# Patient Record
Sex: MALE | Race: WHITE | URBAN - METROPOLITAN AREA
[De-identification: names, ages, dates, MRNs, and addresses within clinical notes are randomized per-mention and may not be internally consistent; named-entity substitution may affect disease eponyms.]

---

## 2024-05-23 ENCOUNTER — OFFICE VISIT (OUTPATIENT)
Dept: PRIMARY CARE | Facility: CLINIC | Age: 44
End: 2024-05-23
Payer: COMMERCIAL

## 2024-05-23 VITALS
HEIGHT: 71 IN | DIASTOLIC BLOOD PRESSURE: 56 MMHG | TEMPERATURE: 98.3 F | RESPIRATION RATE: 14 BRPM | BODY MASS INDEX: 24.22 KG/M2 | SYSTOLIC BLOOD PRESSURE: 124 MMHG | HEART RATE: 64 BPM | WEIGHT: 173 LBS | OXYGEN SATURATION: 96 %

## 2024-05-23 DIAGNOSIS — E78.41 ELEVATED LIPOPROTEIN(A): ICD-10-CM

## 2024-05-23 DIAGNOSIS — N28.89 NODULE OF KIDNEY: Primary | ICD-10-CM

## 2024-05-23 DIAGNOSIS — R79.89 ELEVATED TESTOSTERONE LEVEL: ICD-10-CM

## 2024-05-23 DIAGNOSIS — Z00.00 ROUTINE HEALTH MAINTENANCE: ICD-10-CM

## 2024-05-23 DIAGNOSIS — K86.2 PANCREATIC CYST (HHS-HCC): ICD-10-CM

## 2024-05-23 DIAGNOSIS — M46.1 SACROILIITIS (CMS-HCC): ICD-10-CM

## 2024-05-23 DIAGNOSIS — M25.812 IMPINGEMENT OF LEFT SHOULDER: ICD-10-CM

## 2024-05-23 DIAGNOSIS — N28.1 RENAL CYST: ICD-10-CM

## 2024-05-23 PROBLEM — L20.9 ATOPIC DERMATITIS: Status: ACTIVE | Noted: 2024-05-23

## 2024-05-23 PROBLEM — J18.9 PNEUMONIA: Status: ACTIVE | Noted: 2019-08-29

## 2024-05-23 PROCEDURE — 99386 PREV VISIT NEW AGE 40-64: CPT | Performed by: STUDENT IN AN ORGANIZED HEALTH CARE EDUCATION/TRAINING PROGRAM

## 2024-05-23 PROCEDURE — 1036F TOBACCO NON-USER: CPT | Performed by: STUDENT IN AN ORGANIZED HEALTH CARE EDUCATION/TRAINING PROGRAM

## 2024-05-23 RX ORDER — ROSUVASTATIN CALCIUM 5 MG/1
5 TABLET, COATED ORAL
COMMUNITY
Start: 2024-05-09 | End: 2025-05-09

## 2024-05-23 ASSESSMENT — ENCOUNTER SYMPTOMS
LIGHT-HEADEDNESS: 0
VOMITING: 0
DIAPHORESIS: 0
FEVER: 0
DIZZINESS: 0
CHILLS: 0
NAUSEA: 0
DIARRHEA: 0
MYALGIAS: 0
SHORTNESS OF BREATH: 0
UNEXPECTED WEIGHT CHANGE: 0
DYSURIA: 0

## 2024-05-23 NOTE — PATIENT INSTRUCTIONS
Please call [phone] to schedule the CT scan of your kidneys.  Please follow-up with [facility] regarding the pancreas cyst and you can call [phone] to schedule this.  Please get fasting blood work at your earliest convenience.  Follow-up with me in 1 year for physical, sooner if needed

## 2024-05-23 NOTE — PROGRESS NOTES
"Subjective   Patient ID: Sanjeev Alexandra is a 43 y.o. male who presents for Establish Care.  HPI    He is here to establish care and for CPE. No acute concerns today.         Review of Systems   Constitutional:  Negative for chills, diaphoresis, fever and unexpected weight change.   HENT:  Negative for hearing loss.    Eyes:  Negative for visual disturbance.   Respiratory:  Negative for shortness of breath.    Cardiovascular:  Negative for chest pain.   Gastrointestinal:  Negative for diarrhea, nausea and vomiting.   Endocrine: Negative for cold intolerance and heat intolerance.   Genitourinary:  Negative for dysuria, scrotal swelling and testicular pain.   Musculoskeletal:  Negative for myalgias.   Skin:  Negative for rash.   Neurological:  Negative for dizziness, syncope and light-headedness.       /56   Pulse 64   Temp 36.8 °C (98.3 °F) (Temporal)   Resp 14   Ht 1.803 m (5' 11\")   Wt 78.5 kg (173 lb)   SpO2 96%   BMI 24.13 kg/m²     Objective   Physical Exam  Vitals reviewed.   Constitutional:       General: He is not in acute distress.     Appearance: Normal appearance.   HENT:      Head: Normocephalic.      Right Ear: Tympanic membrane, ear canal and external ear normal. There is no impacted cerumen.      Left Ear: Tympanic membrane, ear canal and external ear normal. There is no impacted cerumen.      Mouth/Throat:      Mouth: Mucous membranes are moist.      Pharynx: Oropharynx is clear. No oropharyngeal exudate or posterior oropharyngeal erythema.   Cardiovascular:      Rate and Rhythm: Normal rate and regular rhythm.   Pulmonary:      Effort: Pulmonary effort is normal. No respiratory distress.      Breath sounds: Normal breath sounds.   Abdominal:      General: Bowel sounds are normal. There is no distension.      Palpations: Abdomen is soft. There is no mass.      Tenderness: There is no abdominal tenderness. There is no guarding or rebound.   Musculoskeletal:         General: No deformity. "      Cervical back: Neck supple. No tenderness.   Lymphadenopathy:      Cervical: No cervical adenopathy.   Skin:     Coloration: Skin is not jaundiced.   Neurological:      Mental Status: He is alert.         Assessment/Plan   Problem List Items Addressed This Visit       Nodule of kidney - Primary    Relevant Orders    CT kidney w and wo IV contrast    Routine health maintenance    Relevant Orders    HIV 1/2 Antigen/Antibody Screen with Reflex to Confirmation    Hepatitis C Antibody    CBC    Comprehensive Metabolic Panel    Lipid Panel    TSH with reflex to Free T4 if abnormal    Renal cyst    Sacroiliitis (CMS-HCC)    Elevated lipoprotein(a)    Impingement of left shoulder    Elevated testosterone level    Pancreatic cyst (HHS-HCC)       Pancreatic cyst  -Seeing GI and advised f/u, recommended he reach out to GI to discuss follow up imaging as it can be directly compared to previous imaging and they appeared to try to reach him for follow up.     Renal cyst  Renal nodularity  -F/u 6-12 months was advised on MRI from 3/2023 for pancreas  -CT kidney with and wo contrast ordered, explained this is with radiation and different from MRI and with IV contrast.     Sacroiliitis  -This resolved    Elevated lipoprotein a  - On low-dose rosuvastatin 5mg/day for 3-4 months to achieve LDL less than 70, saw cardiology  -Repeat lipid panel ordered    Left shoulder impingement  - Sees orthopedics for this, suspected to be biceps tendinitis. Still sore but not major issue. F/u orthopedics as needed for this.    Elevated testosterone  - Endocrinology was consulted at [facility], was likely indicating high SHBG and not of clinical significance.  - Was told doesn't need follow up for this. Management per endocrinology. Per their last note he is to follow up as needed.     Health Maintenance  -Prostate Cancer Screening: Age 50  -Vaccinations: Reports TDAP done 7 years ago while wife was pregnant. Advised covid booster. Flu  vaccine after labor day.   -Lung Cancer Screening: Not indicated  -AAA Screening:Not indicated  -Colonoscopy: Due 2030, done 2020.   -Screen for HIV/Hep C: Ordered    CPE completed.  Advised to keep a heart healthy, low fat  diet with fruits and veggies like Mediterranean diet.  Advised on the importance of exercise and maintaining 150 minutes of exercise per week (30 minutes per day 5 days a week).  Advised on regular eye and dental visits.  Discussed age appropriate cancer screening, immunizations and recommendations given.  Discussed avoiding illicit drugs and tobacco. Advised on appropriate use of alcohol.  Advised to wear seat belt.    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     CPE 1 year  F/u sooner PRN  Fasting labs ordered

## 2024-05-24 ENCOUNTER — LAB (OUTPATIENT)
Dept: LAB | Facility: LAB | Age: 44
End: 2024-05-24
Payer: COMMERCIAL

## 2024-05-24 DIAGNOSIS — Z00.00 ROUTINE HEALTH MAINTENANCE: ICD-10-CM

## 2024-05-24 DIAGNOSIS — R17 ELEVATED BILIRUBIN: ICD-10-CM

## 2024-05-24 LAB
ALBUMIN SERPL BCP-MCNC: 4.6 G/DL (ref 3.4–5)
ALP SERPL-CCNC: 53 U/L (ref 33–120)
ALT SERPL W P-5'-P-CCNC: 47 U/L (ref 10–52)
ANION GAP SERPL CALC-SCNC: 13 MMOL/L (ref 10–20)
AST SERPL W P-5'-P-CCNC: 24 U/L (ref 9–39)
BILIRUB SERPL-MCNC: 1.6 MG/DL (ref 0–1.2)
BUN SERPL-MCNC: 22 MG/DL (ref 6–23)
CALCIUM SERPL-MCNC: 9.4 MG/DL (ref 8.6–10.6)
CHLORIDE SERPL-SCNC: 103 MMOL/L (ref 98–107)
CHOLEST SERPL-MCNC: 130 MG/DL (ref 0–199)
CHOLESTEROL/HDL RATIO: 2.1
CO2 SERPL-SCNC: 29 MMOL/L (ref 21–32)
CREAT SERPL-MCNC: 1.03 MG/DL (ref 0.5–1.3)
EGFRCR SERPLBLD CKD-EPI 2021: >90 ML/MIN/1.73M*2
ERYTHROCYTE [DISTWIDTH] IN BLOOD BY AUTOMATED COUNT: 13.1 % (ref 11.5–14.5)
GLUCOSE SERPL-MCNC: 85 MG/DL (ref 74–99)
HCT VFR BLD AUTO: 47 % (ref 41–52)
HDLC SERPL-MCNC: 60.5 MG/DL
HGB BLD-MCNC: 15 G/DL (ref 13.5–17.5)
HIV 1+2 AB+HIV1 P24 AG SERPL QL IA: NONREACTIVE
LDLC SERPL CALC-MCNC: 56 MG/DL
MCH RBC QN AUTO: 26 PG (ref 26–34)
MCHC RBC AUTO-ENTMCNC: 31.9 G/DL (ref 32–36)
MCV RBC AUTO: 82 FL (ref 80–100)
NON HDL CHOLESTEROL: 70 MG/DL (ref 0–149)
NRBC BLD-RTO: 0 /100 WBCS (ref 0–0)
PLATELET # BLD AUTO: 172 X10*3/UL (ref 150–450)
POTASSIUM SERPL-SCNC: 4.7 MMOL/L (ref 3.5–5.3)
PROT SERPL-MCNC: 7.1 G/DL (ref 6.4–8.2)
RBC # BLD AUTO: 5.76 X10*6/UL (ref 4.5–5.9)
SODIUM SERPL-SCNC: 140 MMOL/L (ref 136–145)
TRIGL SERPL-MCNC: 68 MG/DL (ref 0–149)
TSH SERPL-ACNC: 1.64 MIU/L (ref 0.44–3.98)
VLDL: 14 MG/DL (ref 0–40)
WBC # BLD AUTO: 4.6 X10*3/UL (ref 4.4–11.3)

## 2024-05-24 PROCEDURE — 36415 COLL VENOUS BLD VENIPUNCTURE: CPT

## 2024-05-24 PROCEDURE — 80053 COMPREHEN METABOLIC PANEL: CPT

## 2024-05-24 PROCEDURE — 80061 LIPID PANEL: CPT

## 2024-05-24 PROCEDURE — 86803 HEPATITIS C AB TEST: CPT

## 2024-05-24 PROCEDURE — 87389 HIV-1 AG W/HIV-1&-2 AB AG IA: CPT

## 2024-05-24 PROCEDURE — 84443 ASSAY THYROID STIM HORMONE: CPT

## 2024-05-24 PROCEDURE — 82248 BILIRUBIN DIRECT: CPT

## 2024-05-24 PROCEDURE — 85027 COMPLETE CBC AUTOMATED: CPT

## 2024-05-25 DIAGNOSIS — R17 ELEVATED BILIRUBIN: Primary | ICD-10-CM

## 2024-05-25 LAB
BILIRUB DIRECT SERPL-MCNC: 0.3 MG/DL (ref 0–0.3)
HCV AB SER QL: NONREACTIVE

## 2024-06-06 ENCOUNTER — HOSPITAL ENCOUNTER (OUTPATIENT)
Dept: RADIOLOGY | Facility: CLINIC | Age: 44
Discharge: HOME | End: 2024-06-06
Payer: COMMERCIAL

## 2024-06-06 DIAGNOSIS — N28.89 NODULE OF KIDNEY: ICD-10-CM

## 2024-06-06 PROCEDURE — 74170 CT ABD WO CNTRST FLWD CNTRST: CPT | Performed by: RADIOLOGY

## 2024-06-06 PROCEDURE — 74170 CT ABD WO CNTRST FLWD CNTRST: CPT

## 2024-06-06 PROCEDURE — 2550000001 HC RX 255 CONTRASTS: Performed by: STUDENT IN AN ORGANIZED HEALTH CARE EDUCATION/TRAINING PROGRAM

## 2024-06-06 RX ADMIN — IOHEXOL 90 ML: 350 INJECTION, SOLUTION INTRAVENOUS at 09:44

## 2024-07-08 ENCOUNTER — APPOINTMENT (OUTPATIENT)
Dept: SLEEP MEDICINE | Facility: CLINIC | Age: 44
End: 2024-07-08
Payer: COMMERCIAL

## 2024-07-17 ENCOUNTER — APPOINTMENT (OUTPATIENT)
Dept: SLEEP MEDICINE | Facility: CLINIC | Age: 44
End: 2024-07-17
Payer: COMMERCIAL

## 2024-07-17 DIAGNOSIS — G47.8 CATATHRENIA: ICD-10-CM

## 2024-07-17 DIAGNOSIS — R53.83 FATIGUE, UNSPECIFIED TYPE: ICD-10-CM

## 2024-07-17 DIAGNOSIS — G47.8 NON-RESTORATIVE SLEEP: ICD-10-CM

## 2024-07-17 DIAGNOSIS — G47.30 SLEEP-DISORDERED BREATHING: Primary | ICD-10-CM

## 2024-07-17 PROCEDURE — 1036F TOBACCO NON-USER: CPT | Performed by: PSYCHIATRY & NEUROLOGY

## 2024-07-17 PROCEDURE — 99204 OFFICE O/P NEW MOD 45 MIN: CPT | Performed by: PSYCHIATRY & NEUROLOGY

## 2024-07-17 NOTE — PROGRESS NOTES
Patient: Sanjeev Alexandra    [id number]  : 1980 -- AGE 44 y.o.    Provider: Gregory Deshpande MD     [facility]   Service Date: 2024              [facility]  New Visit Note      Virtual or Telephone Consent  An interactive audio and video telecommunication system which permits real time communications between the patient (at the originating site) and provider (at the distant site) was utilized to provide this telehealth service.   Verbal consent was requested and obtained from Sanjeev Alexandra on this date, 24 for a telehealth visit.   I verified the patient's identity and physical location in [place].  If this is a new patient to me, I informed the patient of my name and type of active [place] license that I hold.        The patient's referring provider is: No ref. provider found    HPI:  Sanjeev Alexandra is a 44 y.o. male with hyperlipidemia who presents today with suspected sleep apnea with poor sleep that is nonrestorative, fatigue, snoring, and waking up multiple times per night.    He has snoring that is rated 3 out of 10 in severity, started many years ago, worse when prone, unknown if worsening over time. He is usually unaware of waking up in the night. Moaning in sleep started in middle school. Generally does not wake up in the night. Has been told he holds his breath in his sleep each night and then gasps for air. No matter how much he sleeps he feels his sleep is not restorative. Did not wake up feeling refreshed in high school, but woke up early for sports.    Denies nocturnal choking, night sweats, nocturnal reflux, bruxism, morning sore throat/dry mouth, morning headaches, or nocturia.    Denies dreaming at sleep onset, sleep-related hallucinations, sleep paralysis, sleepwalking, sleep talking, or dream enactment behavior.    He has put off doing a sleep study due to cost, but now it would be feasible for him.    He tried an OTC oral appliance for  sleep apnea for a few nights, but did not tolerate it.    Traveling to [place] in a couple of weeks.    DAYTIME SYMPTOMS  [place]: 4/24  Insomnia severity index: 5/28  Daytime sleepiness: mild, more so when idle - can doze if he wants to  Fatigue: moderate in intensity consistently  Trouble with memory/concentration: has some short-term memory complaints  Feeling sleepy while driving: no    RLS symptoms: No   Cataplexy: No    SLEEP HABITS:   Preferred sleep position: prone or side, cannot stay asleep when supine  Bedtime: 10 pm, sleep latency 15 min  Wake time: 6-7 am  Napping: no.   Total estimated sleep per 24 hrs: 7-9 hours    PRIOR SLEEP STUDIES:  None     PRIOR TREATMENTS:  None     Patient Active Problem List   Diagnosis    Pneumonia    Atopic dermatitis    Nodule of kidney    Routine health maintenance    Renal cyst    Sacroiliitis (CMS-HCC)    Elevated lipoprotein(a)    Impingement of left shoulder    Elevated testosterone level    Pancreatic cyst (HHS-HCC)     No past medical history on file.  Past Surgical History:   Procedure Laterality Date    APPENDECTOMY  03/13/2017    Appendectomy    TONSILLECTOMY  03/13/2017    Tonsillectomy    VASECTOMY  05/31/2024       Current Outpatient Medications   Medication Sig Dispense Refill    rosuvastatin (Crestor) 5 mg tablet Take 1 tablet (5 mg) by mouth once daily.       No current facility-administered medications for this visit.     Allergies   Allergen Reactions    Cefadroxil Other and Rash     **NOTE** Tolerated augmentin 8/2019      Around 1998, had minor rash with cefadroxil       FAMILY HISTORY OF SLEEP DISORDERS: father is a poor sleeper  Family History   Problem Relation Name Age of Onset    No Known Problems Mother      Hypertension Father         SOCIAL HISTORY  Employment: self-employed - commercial real estate investing  Lives with: wife  Alcohol: 1-2 beers/week  Cigarettes: never  Illicits: none  Caffeine: 2 servings/day - helps with alertness     ROS: 12  point ROS is negative for all items.    PHYSICAL EXAMINATION:   General: Awake. Alert. Comfortable. No apparent distress.   Speech: Normal  Comprehension: Normal  Mood: Stable  Affect: Appropriate  Eyes:   Eyelids: normal            ENT:          Unable to assess patency of nasal passageways or for presence of nasal septum deviation. Unable to clearly view posterior oropharynx to assess tonsils, uvula, and Martínez tongue score. Tongue scalloping is not present, tongue is not enlarged, Retrognathia is not present. Dentition is very good, though some teeth are malaligned.           Neck:          Circumference: normal in caliber  Cardiac:  unable to assess pulses or cardiac rate/rhythm.  Pul:          Normal respiratory effort   Abd:         non-obese  Neuro: Alert, well-oriented. Cranial nerves II-XII grossly normal and symmetric.  No abnormal movements noted      LABS/DIAGNOSTICS:  Lab Results   Component Value Date    HGB 15.0 05/24/2024    CO2 29 05/24/2024    TSH 1.64 05/24/2024        ASSESSMENT AND PLAN: Mr. Sanjeev Alexandra is a 44 y.o. male with a history of years of snoring with nightly witnessed apneas followed by gasping for air (which he does not notice), frequent moaning in sleep (catathrenia - benign, may be associated with sleep apnea), nonrestorative sleep, fatigue, daytime sleepiness, and memory complaints. He likely has sleep apnea.      #sleep disordered breathing  #fatigue  #non-restorative sleep  -We discussed the risk factors for sleep apnea, pathophysiology of sleep apnea, treatment options, and potential long-term complications of untreated MADELINE, including cardiovascular and metabolic complications. We will start evaluation with a home sleep test.     #catathrenia   -informed pt that this is benign and may be associated with sleep apnea    All of the above was discussed with the patient in detail. He voiced an understanding of the above and was agreeable to proceed further as advised. Procedure  for the sleep study was discussed with him.    FOLLOW UP:  After study to discuss results

## 2024-08-08 ENCOUNTER — PROCEDURE VISIT (OUTPATIENT)
Dept: SLEEP MEDICINE | Facility: HOSPITAL | Age: 44
End: 2024-08-08
Payer: COMMERCIAL

## 2024-08-08 DIAGNOSIS — R53.83 FATIGUE, UNSPECIFIED TYPE: ICD-10-CM

## 2024-08-08 DIAGNOSIS — G47.8 NON-RESTORATIVE SLEEP: ICD-10-CM

## 2024-08-08 DIAGNOSIS — G47.8 CATATHRENIA: ICD-10-CM

## 2024-08-08 DIAGNOSIS — G47.30 SLEEP-DISORDERED BREATHING: ICD-10-CM

## 2024-08-08 PROCEDURE — 95806 SLEEP STUDY UNATT&RESP EFFT: CPT | Performed by: PSYCHIATRY & NEUROLOGY

## 2024-08-26 ENCOUNTER — APPOINTMENT (OUTPATIENT)
Dept: SLEEP MEDICINE | Facility: CLINIC | Age: 44
End: 2024-08-26
Payer: COMMERCIAL

## 2024-08-26 DIAGNOSIS — G47.33 OSA (OBSTRUCTIVE SLEEP APNEA): Primary | ICD-10-CM

## 2024-08-26 PROCEDURE — 99213 OFFICE O/P EST LOW 20 MIN: CPT | Performed by: PSYCHIATRY & NEUROLOGY

## 2024-08-26 NOTE — PROGRESS NOTES
Patient: Sanjeev Alexandra    [id number]  : 1980 -- AGE 44 y.o.    Provider: Gregory Deshpande MD     [facility]   Service Date: 2024              [facility]  Follow-up Note      Virtual or Telephone Consent  An interactive audio and video telecommunication system which permits real time communications between the patient (at the originating site) and provider (at the distant site) was utilized to provide this telehealth service.   Verbal consent was requested and obtained from Sanjeev Alexandra on this date, 24 for a telehealth visit.   I verified the patient's identity and physical location in [place].  If this is a new patient to me, I informed the patient of my name and type of active [place] license that I hold.          HPI: Sanjeev Alexandra is a 44 y.o. male with HLD who presented on 24 with snoring, nonrestorative sleep, witnessed apneas followed by gasping during sleep, and daily fatigue.  He underwent sleep testing with a home sleep study. He is here today for a follow up visit to review the results.     Home sleep study 2024: Weight 77.1 kg, BMI 23.72.  Study showed borderline mild MADELINE with AHI3% 5.2/h (supine 7.3/h, non-supine 4.4/h).  However, by CMS scoring criteria, this study is nondiagnostic for sleep apnea with an AHI4% of 2.1/h.  Percent snoring time 2%.  Baseline SpO2 95%, mean 94%, nato 81% with 0.4 minutes at or below 90%.  There is some clustering of respiratory events suggesting a possible REM sleep phenomenon, but this cannot be confirmed on this study due to lack of EEG monitoring.    Report and hypnogram reviewed person by me today.  Reviewed test results in detail with the patient. Treatment options reviewed in detail.       Past Surgical History:   Procedure Laterality Date    APPENDECTOMY  2017    Appendectomy    TONSILLECTOMY  2017    Tonsillectomy    VASECTOMY  2024     Current Outpatient Medications on  File Prior to Visit   Medication Sig Dispense Refill    rosuvastatin (Crestor) 5 mg tablet Take 1 tablet (5 mg) by mouth once daily.       No current facility-administered medications on file prior to visit.       PHYSICAL EXAMINATION:   General: Awake. Alert. Comfortable. No apparent distress.   Speech: Normal.  Comprehension: Normal.  Mood: Stable.  Affect: Appropriate.  Pul:         Normal respiratory effort.   Neuro: Alert, well-oriented. Cranial nerves II-XII grossly normal and symmetric.  Moves all limbs symmetrically with no evidence of significant focal weakness. No abnormal movements noted. Normal gait      ASSESSMENT AND PLAN: Mr. Sanjeev Alexandra is a 44 y.o. male with symptomatic mild MADELINE per AASM criteria, but criteria not met by CMS insurance scoring (which his insurance follows). Treatment options were reviewed in detail.     #MADELINE  -start with nasal EPAP (ULTepap), if fails/not tolerated try oral appliance through dentist, otherwise consider in-lab PSG to try to qualify him for CPAP    All of the above was discussed with the patient in detail. He voiced an understanding of the above and was agreeable to proceed further as advised.     Around 25 minutes were spent with the patient plus time spent reviewing the chart, updating the chart as needed, and documenting.     FOLLOW UP: 8 weeks

## 2024-09-03 ENCOUNTER — TELEPHONE (OUTPATIENT)
Dept: SLEEP MEDICINE | Facility: CLINIC | Age: 44
End: 2024-09-03

## 2024-09-04 DIAGNOSIS — G47.33 OSA (OBSTRUCTIVE SLEEP APNEA): Primary | ICD-10-CM

## 2024-09-04 DIAGNOSIS — R06.83 SNORING: ICD-10-CM

## 2025-05-28 ENCOUNTER — APPOINTMENT (OUTPATIENT)
Dept: PRIMARY CARE | Facility: CLINIC | Age: 45
End: 2025-05-28
Payer: MEDICARE

## 2025-08-15 ENCOUNTER — APPOINTMENT (OUTPATIENT)
Facility: HOSPITAL | Age: 45
End: 2025-08-15
Payer: COMMERCIAL

## 2025-08-15 DIAGNOSIS — Z13.6 ENCOUNTER FOR SCREENING FOR CARDIOVASCULAR DISORDERS: ICD-10-CM

## 2025-08-15 PROCEDURE — 75571 CT HRT W/O DYE W/CA TEST: CPT
